# Patient Record
Sex: FEMALE | Race: WHITE
[De-identification: names, ages, dates, MRNs, and addresses within clinical notes are randomized per-mention and may not be internally consistent; named-entity substitution may affect disease eponyms.]

---

## 2018-02-26 ENCOUNTER — HOSPITAL ENCOUNTER (EMERGENCY)
Dept: HOSPITAL 17 - PHEFT | Age: 28
Discharge: HOME | End: 2018-02-26
Payer: SELF-PAY

## 2018-02-26 VITALS — HEIGHT: 60 IN | BODY MASS INDEX: 27.48 KG/M2 | WEIGHT: 139.99 LBS

## 2018-02-26 VITALS
DIASTOLIC BLOOD PRESSURE: 52 MMHG | OXYGEN SATURATION: 96 % | HEART RATE: 73 BPM | TEMPERATURE: 98.5 F | RESPIRATION RATE: 16 BRPM | SYSTOLIC BLOOD PRESSURE: 93 MMHG

## 2018-02-26 DIAGNOSIS — J01.90: Primary | ICD-10-CM

## 2018-02-26 DIAGNOSIS — F17.200: ICD-10-CM

## 2018-02-26 PROCEDURE — 99283 EMERGENCY DEPT VISIT LOW MDM: CPT

## 2018-02-26 NOTE — PD
HPI


Chief Complaint:  Cold / Flu Symptoms


Time Seen by Provider:  12:23


Travel History


International Travel<30 days:  No


Contact w/Intl Traveler<30days:  No


Traveled to known affect area:  No





History of Present Illness


HPI


27-year-old female that presents to the ED for evaluation of cold like 

symptoms.  Patient has escalating symptoms since Friday.  Per patient he worsen 

on Saturday and .  She states having body aches, chest pain and back 

cough with sinus congestion and right ear pain.  Per patient she's been taking 

OTC meds with minimal relief.  Per patient's symptoms worsen no Saturday night 

after she went kayaking.  She denies any urinary or bowel movement issues.  

Chest is only with cough.  Cough is nonproductive.  No other sick contacts.  No 

recent travel.  No urinary or bowel movement issues.  No medical issues.  She 

does have a history of ear infections when she was little.  Her pain per 

patient is 4 out of 10.





PFSH


Past Medical History


Anxiety:  Yes


Tetanus Vaccination:  > 5 Years


Influenza Vaccination:  No


Pregnant?:  Not Pregnant


LMP:  Now


:  1


:  1





Past Surgical History


Appendectomy:  Yes





Social History


Alcohol Use:  No


Tobacco Use:  Yes (2 packs/week )


Substance Use:  No





Allergies-Medications


(Allergen,Severity, Reaction):  


Coded Allergies:  


     No Known Allergies (Unverified  Adverse Reaction, Unknown, 18)


Reported Meds & Prescriptions





Reported Meds & Active Scripts


Active


Tessalon Perles (Benzonatate) 100 Mg Cap 100 Mg PO TID PRN


Prednisone 20 Mg Tab 20 Mg PO BID 5 Days


Amoxicillin 875 Mg Tab 875 Mg PO BID 10 Days


Reported


Zoloft (Sertraline HCl) 100 Mg Tab 150 Mg PO DAILY








Review of Systems


Except as stated in HPI:  all other systems reviewed are Neg





Physical Exam


Narrative


GENERAL: Well-nourished, well-developed patient in no apparent distress.


SKIN: Warm and dry.


HEAD: Atraumatic. Normocephalic. 


EYES: Pupils equal and round reactive to light and accommodation.  No scleral 

icterus. No injection or drainage.  


ENT: No nasal bleeding or discharge.  Mucous membranes pink and moist.  TMs are 

clear with no sign of infection or perforation except for the right TM which is 

erythematous and bulging.  No mastoid tenderness.  Ear canals are intact 

bilaterally.  No lymphadenopathy.  Nostril mucosa is red and moist with clear 

mucus noted.    Tonsils are not enlarged or swollen. No ulvua Deviation.  

Tongue is midline.  Patient does have sinus tenderness on the maxillary sinus.


NECK: Trachea midline. No JVD.  No meningeal signs noted.


CARDIOVASCULAR: Regular rate and rhythm.  


RESPIRATORY: No accessory muscle use. Clear to auscultation. Breath sounds 

equal bilaterally. 


GASTROINTESTINAL: Abdomen soft, non-tender, nondistended. Hepatic and splenic 

margins not palpable. 


MUSCULOSKELETAL: Extremities without clubbing, cyanosis, or edema. No obvious 

deformities. 


NEUROLOGICAL: Awake and alert. No obvious cranial nerve deficits.  Motor 

grossly within normal limits. Five out of 5 muscle strength in the arms and 

legs.  Normal speech.


PSYCHIATRIC: Appropriate mood and affect; insight and judgment normal.





Data


Data


Last Documented VS





Vital Signs








  Date Time  Temp Pulse Resp B/P (MAP) Pulse Ox O2 Delivery O2 Flow Rate FiO2


 


18 12:15   16  96 Room Air  


 


18 12:10 98.5 73  93/52 (66)    








Orders





 Orders


Ed Discharge Order (18 12:28)








[facility]


Medical Decision Making


Medical Screen Exam Complete:  Yes


Emergency Medical Condition:  Yes


Medical Record Reviewed:  Yes


Differential Diagnosis


Viral illness versus sinusitis versus rhinosinusitis versus bronchitis


Narrative Course


27-year-old female that presents to the ED for evaluation of cold-like 

symptoms.  Patient was properly examined and was found to have signs and 

symptoms consistent appears to be likely rhinosinusitis.  No sign of influenza 

at this time.  At this time I recommend treatment with amoxicillin, Tessalon 

Perles, prednisone.  Told to take OTC medicines as needed.  Follow with PCP.  

See ED worsening symptoms.





Diagnosis





 Primary Impression:  


 Acute rhinosinusitis


Patient Instructions:  General Instructions


Departure Forms:  Tests/Procedures, Work Release   Enter return to work date:  

Mar 1, 2018





***Additional Instructions:  


Motrin and Tylenol for pain and fever.


You can use over-the-counter antihistamine as well as well as Mucinex as needed 

for runny nose and congestion.


Cough drops for cough as needed.


Drink plenty of fluids.


Follow-up with PCP.


See ED for worsening symptoms.


***Med/Other Pt SpecificInfo:  Prescription(s) given


Scripts


Benzonatate (Tessalon Perles) 100 Mg Cap


100 MG PO TID Y for COUGH, #20 CAP 0 Refills


   Prov: Edmund Mathews MD         18 


Prednisone (Prednisone) 20 Mg Tab


20 MG PO BID for 5 Days, #10 TAB 0 Refills


   Prov: Edmund Mathews MD         18 


Amoxicillin (Amoxicillin) 875 Mg Tab


875 MG PO BID for Infection for 10 Days, #20 TAB 0 Refills


   Prov: Edmund Mathews MD         18


Disposition:  01 DISCHARGE HOME


Condition:  Stable











Austin Rizzo 2018 12:31